# Patient Record
Sex: FEMALE | Race: WHITE
[De-identification: names, ages, dates, MRNs, and addresses within clinical notes are randomized per-mention and may not be internally consistent; named-entity substitution may affect disease eponyms.]

---

## 2018-10-29 ENCOUNTER — HOSPITAL ENCOUNTER (OUTPATIENT)
Dept: HOSPITAL 31 - C.SDS | Age: 43
Discharge: HOME | End: 2018-10-29
Attending: OBSTETRICS & GYNECOLOGY
Payer: MEDICAID

## 2018-10-29 VITALS — BODY MASS INDEX: 29 KG/M2

## 2018-10-29 VITALS
RESPIRATION RATE: 71 BRPM | SYSTOLIC BLOOD PRESSURE: 114 MMHG | DIASTOLIC BLOOD PRESSURE: 73 MMHG | HEART RATE: 71 BPM | OXYGEN SATURATION: 99 % | TEMPERATURE: 97.6 F

## 2018-10-29 DIAGNOSIS — N92.0: ICD-10-CM

## 2018-10-29 DIAGNOSIS — D25.0: Primary | ICD-10-CM

## 2018-10-29 PROCEDURE — 88305 TISSUE EXAM BY PATHOLOGIST: CPT

## 2018-10-29 PROCEDURE — 58558 HYSTEROSCOPY BIOPSY: CPT

## 2018-10-29 NOTE — PCM.SURG1
Surgeon's Initial Post Op Note





- Surgeon's Notes


Surgeon: DR SNEED


Assistant: NONE


Type of Anesthesia: General Mask


Anesthesia Administered By: DR SCHWARTZ


Pre-Operative Diagnosis: 43 YR  WITH MENORRHAGIA/FIBROID UTERUS


Operative Findings: SEE HE OP REORT


Post-Operative Diagnosis: SAME


Operation Performed: MYSURE/D&C, HYSTERSCOPY


Specimen/Specimens Removed: ECC.  EMC.  SUBMUSAL FIBROID


Estimated Blood Loss: EBL {In ML}: 80


Blood Products Given: N/A


Drains Used: No Drains


Post-Op Condition: Good


Date of Surgery/Procedure: 10/29/18


Time of Surgery/Procedure: 09:00

## 2018-10-29 NOTE — OP
PROCEDURE DATE:  10/29/2018



PREOPERATIVE DIAGNOSES:  A 43-year-old female  2, para 2 with

menorrhagia, submucosal fibroid.



POSTOPERATIVE DIAGNOSES:  A 43-year-old female  2, para 2 with

menorrhagia, submucosal fibroid.



PROCEDURE:  MyoSure, dilatation and curettage.



SURGEON:  Hubert Velázquez MD.



ASSISTANT:  None.



TYPE OF ANESTHESIA:  General.



ANESTHESIA ADMINISTERED BY:  Dr. Beverly.



COMPLICATIONS:  None.



DESCRIPTION OF PROCEDURE:  After informed consent was obtained, the patient

was brought to the operating room, placed on the table where general

anesthesia was given.  The patient was prepped and draped in the normal

sterile fashion.  Examination found the uterus to be 6 weeks' size.  No

pelvic or adnexal masses.  After that, gentle dilation of the cervix was

done.  Hysteroscope was then introduced and found the polyp to be on the

anterior wall of the uterus, submucosal fibroid and the decision was made

to use the MyoSure.  MyoSure was used to take out the fibroid.  Then after

sharp curettage of the endometrium was done, specimen was sent to

Pathology.  Then ECC was sent to Pathology.  _____.  The patient tolerated

the procedure well.  Lap, sponge, and instrument counts were correct x2.





__________________________________________

Hubert Velázquez MD





DD:  10/29/2018 9:08:12

DT:  10/29/2018 10:27:27

Job # [id number]

## 2018-12-12 ENCOUNTER — HOSPITAL ENCOUNTER (INPATIENT)
Dept: HOSPITAL 31 - C.SDS | Age: 43
LOS: 3 days | Discharge: HOME | DRG: 359 | End: 2018-12-15
Attending: OBSTETRICS & GYNECOLOGY | Admitting: OBSTETRICS & GYNECOLOGY
Payer: MEDICAID

## 2018-12-12 VITALS — BODY MASS INDEX: 29 KG/M2

## 2018-12-12 DIAGNOSIS — D25.1: Primary | ICD-10-CM

## 2018-12-12 DIAGNOSIS — N93.9: ICD-10-CM

## 2018-12-12 DIAGNOSIS — N80.0: ICD-10-CM

## 2018-12-12 DIAGNOSIS — D25.0: ICD-10-CM

## 2018-12-12 PROCEDURE — 0UT94ZZ RESECTION OF UTERUS, PERCUTANEOUS ENDOSCOPIC APPROACH: ICD-10-PCS | Performed by: OBSTETRICS & GYNECOLOGY

## 2018-12-12 PROCEDURE — 0UT74ZZ RESECTION OF BILATERAL FALLOPIAN TUBES, PERCUTANEOUS ENDOSCOPIC APPROACH: ICD-10-PCS | Performed by: OBSTETRICS & GYNECOLOGY

## 2018-12-12 RX ADMIN — SIMETHICONE CHEW TAB 80 MG SCH MG: 80 TABLET ORAL at 23:10

## 2018-12-12 RX ADMIN — SIMETHICONE CHEW TAB 80 MG SCH MG: 80 TABLET ORAL at 18:14

## 2018-12-12 RX ADMIN — SIMETHICONE CHEW TAB 80 MG SCH: 80 TABLET ORAL at 14:05

## 2018-12-12 RX ADMIN — OXYCODONE HYDROCHLORIDE AND ACETAMINOPHEN PRN TAB: 5; 325 TABLET ORAL at 16:36

## 2018-12-12 RX ADMIN — OXYCODONE HYDROCHLORIDE AND ACETAMINOPHEN PRN TAB: 5; 325 TABLET ORAL at 23:03

## 2018-12-12 NOTE — PCM.SURG1
Surgeon's Initial Post Op Note





- Surgeon's Notes


Surgeon: dr de leon


Assistant: dr thomas/kendrick


Type of Anesthesia: General Endo, Moderate Sedation{RN}


Pre-Operative Diagnosis: 43 yr  with aub and failed surgical trey


Operative Findings: see the op reprt


Post-Operative Diagnosis: same with fibroid uterus


Operation Performed: ex lap total hystrectomy/b/l salpingectomy


Specimen/Specimens Removed: uterus.  ceervix.  b/k tubes


Estimated Blood Loss: EBL {In ML}: 200


Blood Products Given: N/A


Drains Used: No Drains


Post-Op Condition: Good


Date of Surgery/Procedure: 18


Time of Surgery/Procedure: 10:00

## 2018-12-13 LAB
ERYTHROCYTE [DISTWIDTH] IN BLOOD BY AUTOMATED COUNT: 18 % (ref 11.5–14.5)
HGB BLD-MCNC: 9.3 G/DL (ref 11–16)
MCH RBC QN AUTO: 25.1 PG (ref 27–31)
MCHC RBC AUTO-ENTMCNC: 32.3 G/DL (ref 33–37)
MCV RBC AUTO: 77.6 FL (ref 81–99)
PLATELET # BLD: 218 K/UL (ref 130–400)
PMV BLD AUTO: 11 FL (ref 7.2–11.7)
RBC # BLD AUTO: 3.73 MIL/UL (ref 3.8–5.2)
WBC # BLD AUTO: 8.7 K/UL (ref 4.8–10.8)

## 2018-12-13 RX ADMIN — SIMETHICONE CHEW TAB 80 MG SCH MG: 80 TABLET ORAL at 18:41

## 2018-12-13 RX ADMIN — OXYCODONE HYDROCHLORIDE AND ACETAMINOPHEN PRN TAB: 5; 325 TABLET ORAL at 10:47

## 2018-12-13 RX ADMIN — OXYCODONE HYDROCHLORIDE AND ACETAMINOPHEN PRN TAB: 5; 325 TABLET ORAL at 18:42

## 2018-12-13 RX ADMIN — SIMETHICONE CHEW TAB 80 MG SCH MG: 80 TABLET ORAL at 16:11

## 2018-12-13 RX ADMIN — OXYCODONE HYDROCHLORIDE AND ACETAMINOPHEN PRN TAB: 5; 325 TABLET ORAL at 18:57

## 2018-12-13 RX ADMIN — OXYCODONE HYDROCHLORIDE AND ACETAMINOPHEN PRN TAB: 5; 325 TABLET ORAL at 04:18

## 2018-12-13 RX ADMIN — SIMETHICONE CHEW TAB 80 MG SCH MG: 80 TABLET ORAL at 21:11

## 2018-12-13 RX ADMIN — PRENATAL VIT W/ FE FUMARATE-FA TAB 27-0.8 MG SCH TAB: 27-0.8 TAB at 10:47

## 2018-12-13 RX ADMIN — SIMETHICONE CHEW TAB 80 MG SCH MG: 80 TABLET ORAL at 10:00

## 2018-12-13 NOTE — OP
PROCEDURE DATE:  2018



PREOPERATIVE DIAGNOSIS:  A 43-year-old  2, para 2 with abnormal

uterine bleeding, failed medical management, surgical management.



POSTOPERATIVE DIAGNOSIS:  A 43-year-old  2, para 2 with abnormal

uterine bleeding, failed medical management, surgical management.



SURGEON:  Hubert Velázquez MD



ASSISTANT:  Basil Gaming MD; and GUERO Randhawa



ANESTHESIOLOGIST:  Dr. King.



ANESTHESIA:  General anesthesia.



COMPLICATIONS:  None.



ESTIMATED BLOOD LOSS:  200 mL.



DESCRIPTION OF PROCEDURE:  After informed consent was obtained, the patient

was brought to the operating room, placed on the table, where spinal

anesthesia was given.  Once the anesthesia was given, the patient was

prepped and draped in normal sterile fashion.  Nam catheter was inserted

under sterile condition.  After that, the patient's belly was prepped and

draped and skin incision was made with a knife, subcutaneous cut with a

Bovie and the fascia was then excised on both the sides using curved [place]

scissors.  The fascia was  from the site of the umbilicus and then

at the site of the pubic bone.  Rectus muscle was .  The

peritoneum was incised and we went into the abdominal cavity.  The uterus

was exteriorized and found multiple fibroid uterus.  After that, wet laps

for the bowels.  After that, the decision was to do a total hysterectomy

and the round ligament on the left that was taken with LigaSure.  The

patient is 43 years old.  The plan is to take the ovaries and after that

the window was made on the broad ligament and then the right ovarian

ligament was taken out.  Then the same thing was done on the left side. 

After that, the bladder flap was created on both the sites.  After that

uterine artery was taken with 2 Kochers ____ on the right side so that

multiple white cells were taken out.  The cardinal ligament were taken with

multiple sites.  After that ____.  The bladder blade was pulled back down

with ____.  It was hemostatic.  No bleeding.  Then the incision was made

with the knife and the uterus and the cervix were taken out.  The _____

Yazmin and it was closed with 2-0 Vicryl in an interrupted fashion.    Both

ovaries were then found to be hemostatic.  ____.  Both ovaries were

hemostatic.  No bleeding.  ____ hemostatic.  After that, FloSeal was placed

and found to be hemostatic.  No bleeding.  After that, the patient was

awakened ____.  All the laps were removed.  The fascia was then closed

using 1-0 Vicryl in running interlocking fashion.  Muscle was closed in

interrupted fashion.  Skin was closed using 0 Monocryl on straight needle. 

The patient tolerated the procedure well.  Lap, sponge, and instrument

counts were correct x2.





__________________________________________

Hubert Velázquez MD





DD:  2018 10:13:54

DT:  2018 20:38:40

Job # [id number]

## 2018-12-13 NOTE — CP.PCM.PN
Subjective





- Date & Time of Evaluation


Date of Evaluation: 18


Time of Evaluation: 16:05





- Subjective


Subjective: 





Pt is a 42yo  female who is POD#1 after an ex lap total hysterectomy with 

bilateral salpingectomy. Pt states she has been walking and drinking water. 

Reports mild vaginal bleeding which is improving. Pt denies passing gas and 

denies having a bowel movement. Pt states motrin and percocet have been 

controlling the pain well. Pt has no new complaints at this time. 





Objective





- Vital Signs/Intake and Output


Vital Signs (last 24 hours): 


                                        











Temp Pulse Resp BP Pulse Ox


 


 97.3 F L  85   18   123/79   98 


 


 18 08:45  18 08:45  18 08:45  18 08:45  18 08:45








Intake and Output: 


                                        











 18





 06:59 18:59


 


Intake Total 1350 1600


 


Output Total 350 600


 


Balance 1000 1000














- Medications


Medications: 


                               Current Medications





Docusate Sodium (Colace)  100 mg PO BID [facility]


   Last Admin: 18 10:01 Dose:  100 mg


Lactated Ringer's (Lactated Ringer's)  1,000 mls @ 125 mls/hr IV .Q8H [facility]


   Last Admin: 18 23:47 Dose:  125 mls/hr


Ibuprofen (Motrin Tab)  600 mg PO Q6H PRN


   PRN Reason: Pain, Mild (1-3)


   Last Admin: 18 06:40 Dose:  600 mg


Ondansetron HCl (Zofran Inj)  4 mg IVP Q4 PRN


   PRN Reason: Nausea/Vomiting


   Last Admin: 18 09:57 Dose:  4 mg


Oxycodone/Acetaminophen (Percocet 5/325 Mg Tab)  1 tab PO Q4H PRN


   PRN Reason: Pain, moderate (4-7)


   Stop: 12/15/18 09:51


   Last Admin: 18 16:36 Dose:  1 tab


Oxycodone/Acetaminophen (Percocet 5/325 Mg Tab)  2 tab PO Q4H PRN


   PRN Reason: Pain, severe (8-10)


   Stop: 12/15/18 09:51


   Last Admin: 18 10:47 Dose:  2 tab


Prenatal Multivit/Folic Acid/Iron (Prenatal)  1 tab PO DAILY [facility]


   Last Admin: 18 10:47 Dose:  1 tab


Simethicone (Mylicon Chew Tab)  80 mg PO QID [facility]


   Last Admin: 18 10:00 Dose:  80 mg











- Labs


Labs: 


                                        





                                 18 08:45 











- Constitutional


Appears: No Acute Distress





- Head Exam


Head Exam: ATRAUMATIC, NORMAL INSPECTION, NORMOCEPHALIC





- Eye Exam


Eye Exam: EOMI





- ENT Exam


ENT Exam: Mucous Membranes Moist





- Neck Exam


Neck Exam: Full ROM





- Respiratory Exam


Respiratory Exam: Clear to Ausculation Bilateral, NORMAL BREATHING PATTERN.  

absent: Accessory Muscle Use, Wheezes





- Cardiovascular Exam


Cardiovascular Exam: +S1, +S2.  absent: Diastolic murmur, Irregular Rhythm





- GI/Abdominal Exam


GI & Abdominal Exam: Soft, Normal Bowel Sounds.  absent: Tenderness





- Extremities Exam


Extremities Exam: Full ROM.  absent: Pedal Edema





- Neurological Exam


Neurological Exam: Alert, Awake, Oriented x3





- Psychiatric Exam


Psychiatric exam: Normal Affect, Normal Mood





- Skin


Skin Exam: Dry, Normal Color, Warm





Assessment and Plan





- Assessment and Plan (Free Text)


Assessment: 





Pt is a 42yo  status post ex lap total hystrectomy/b/l salpingectomy


Plan: 





Status post ex lap total hystrectomy/b/l salpingectomy


- encourage to walk 


- encourage to continue drinking water


- continue motrin and percocet for pain control








Pt seen and examined


Reid Negrete PGY1, Internal Medicine Resident

## 2018-12-14 VITALS — RESPIRATION RATE: 18 BRPM | OXYGEN SATURATION: 100 %

## 2018-12-14 RX ADMIN — OXYCODONE HYDROCHLORIDE AND ACETAMINOPHEN PRN TAB: 5; 325 TABLET ORAL at 00:57

## 2018-12-14 RX ADMIN — SIMETHICONE CHEW TAB 80 MG SCH MG: 80 TABLET ORAL at 21:46

## 2018-12-14 RX ADMIN — OXYCODONE HYDROCHLORIDE AND ACETAMINOPHEN PRN TAB: 5; 325 TABLET ORAL at 23:04

## 2018-12-14 RX ADMIN — SIMETHICONE CHEW TAB 80 MG SCH MG: 80 TABLET ORAL at 10:14

## 2018-12-14 RX ADMIN — SIMETHICONE CHEW TAB 80 MG SCH MG: 80 TABLET ORAL at 16:02

## 2018-12-14 RX ADMIN — OXYCODONE HYDROCHLORIDE AND ACETAMINOPHEN PRN TAB: 5; 325 TABLET ORAL at 08:16

## 2018-12-14 RX ADMIN — PRENATAL VIT W/ FE FUMARATE-FA TAB 27-0.8 MG SCH TAB: 27-0.8 TAB at 10:14

## 2018-12-15 VITALS — DIASTOLIC BLOOD PRESSURE: 85 MMHG | HEART RATE: 77 BPM | TEMPERATURE: 98 F | SYSTOLIC BLOOD PRESSURE: 119 MMHG

## 2018-12-15 RX ADMIN — SIMETHICONE CHEW TAB 80 MG SCH MG: 80 TABLET ORAL at 09:56

## 2018-12-15 RX ADMIN — PRENATAL VIT W/ FE FUMARATE-FA TAB 27-0.8 MG SCH TAB: 27-0.8 TAB at 09:56

## 2018-12-15 NOTE — CP.PCM.PN
Subjective





- Date & Time of Evaluation


Date of Evaluation: 12/15/18


Time of Evaluation: 10:24





- Subjective


Subjective: 





see previous note





Objective





- Vital Signs/Intake and Output


Vital Signs (last 24 hours): 


                                        











Temp Pulse Resp BP Pulse Ox


 


 98.0 F   77   18   119/85   100 


 


 12/15/18 08:00  12/15/18 08:00  12/15/18 08:00  12/15/18 08:00  12/15/18 08:00











- Medications


Medications: 


                               Current Medications





Docusate Sodium (Colace)  100 mg PO BID [facility]


   Last Admin: 12/15/18 09:43 Dose:  100 mg


Lactated Ringer's (Lactated Ringer's)  1,000 mls @ 125 mls/hr IV .Q8H [facility]


   Last Admin: 12/12/18 23:47 Dose:  125 mls/hr


Ibuprofen (Motrin Tab)  600 mg PO Q6H PRN


   PRN Reason: Pain, Mild (1-3)


   Last Admin: 12/15/18 09:57 Dose:  600 mg


Ondansetron HCl (Zofran Inj)  4 mg IVP Q4 PRN


   PRN Reason: Nausea/Vomiting


   Last Admin: 12/14/18 01:07 Dose:  4 mg


Prenatal Multivit/Folic Acid/Iron (Prenatal)  1 tab PO DAILY [facility]


   Last Admin: 12/15/18 09:56 Dose:  1 tab


Simethicone (Mylicon Chew Tab)  80 mg PO QID [facility]


   Last Admin: 12/15/18 09:56 Dose:  80 mg











- Labs


Labs: 


                                        





                                 12/13/18 08:45 











Assessment and Plan





- Assessment and Plan (Free Text)


Assessment: 





s/p abdominal hysterectomy





vitals stable, afebrile


regular diet tolerating


+ bowel function


pain well controlled


encouraged ambulation, hydration and pelvic rest upon discharge 


plan to DC and follow up as outpatient





Dr. Solange Arvizu laborist on call, patient seen on behalf of Dr. Hubert Velázquez

## 2018-12-15 NOTE — CP.PCM.PN
Subjective





- Date & Time of Evaluation


Date of Evaluation: 12/15/18


Time of Evaluation: 10:08





- Subjective


Subjective: 





no complaints


tolerating diet, passing flatus, voiding spontaneously


pain well controlled





Objective





- Vital Signs/Intake and Output


Vital Signs (last 24 hours): 


                                        











Temp Pulse Resp BP Pulse Ox


 


 98.0 F   77   18   119/85   100 


 


 12/15/18 08:00  12/15/18 08:00  12/15/18 08:00  12/15/18 08:00  12/15/18 08:00











- Medications


Medications: 


                               Current Medications





Docusate Sodium (Colace)  100 mg PO BID [facility]


   Last Admin: 12/15/18 09:43 Dose:  100 mg


Lactated Ringer's (Lactated Ringer's)  1,000 mls @ 125 mls/hr IV .Q8H [facility]


   Last Admin: 12/12/18 23:47 Dose:  125 mls/hr


Ibuprofen (Motrin Tab)  600 mg PO Q6H PRN


   PRN Reason: Pain, Mild (1-3)


   Last Admin: 12/15/18 09:57 Dose:  600 mg


Influenza Virus Vaccine (Fluzone Quad [id number])  60 mcg IM .ONCE ONE


   Stop: 12/15/18 10:16


Ondansetron HCl (Zofran Inj)  4 mg IVP Q4 PRN


   PRN Reason: Nausea/Vomiting


   Last Admin: 12/14/18 01:07 Dose:  4 mg


Prenatal Multivit/Folic Acid/Iron (Prenatal)  1 tab PO DAILY [facility]


   Last Admin: 12/15/18 09:56 Dose:  1 tab


Simethicone (Mylicon Chew Tab)  80 mg PO QID [facility]


   Last Admin: 12/15/18 09:56 Dose:  80 mg











- Labs


Labs: 


                                        





                                 12/13/18 08:45 











- GI/Abdominal Exam


Additional comments: 





abdomen is soft, NTND, +BS, incision in tact with steristrips, healing well, no 

erythema